# Patient Record
Sex: FEMALE | Race: WHITE
[De-identification: names, ages, dates, MRNs, and addresses within clinical notes are randomized per-mention and may not be internally consistent; named-entity substitution may affect disease eponyms.]

---

## 2018-07-07 ENCOUNTER — HOSPITAL ENCOUNTER (OUTPATIENT)
Dept: HOSPITAL 47 - FBPOP | Age: 23
Discharge: HOME | End: 2018-07-07
Attending: OBSTETRICS & GYNECOLOGY
Payer: COMMERCIAL

## 2018-07-07 VITALS
TEMPERATURE: 97.8 F | RESPIRATION RATE: 16 BRPM | HEART RATE: 105 BPM | SYSTOLIC BLOOD PRESSURE: 120 MMHG | DIASTOLIC BLOOD PRESSURE: 75 MMHG

## 2018-07-07 DIAGNOSIS — O21.2: ICD-10-CM

## 2018-07-07 DIAGNOSIS — Z3A.39: ICD-10-CM

## 2018-07-07 DIAGNOSIS — O47.1: Primary | ICD-10-CM

## 2018-07-07 PROCEDURE — 99213 OFFICE O/P EST LOW 20 MIN: CPT

## 2018-07-07 PROCEDURE — 59025 FETAL NON-STRESS TEST: CPT

## 2018-07-16 ENCOUNTER — HOSPITAL ENCOUNTER (INPATIENT)
Dept: HOSPITAL 47 - 4FBP | Age: 23
LOS: 3 days | Discharge: HOME | End: 2018-07-19
Attending: OBSTETRICS & GYNECOLOGY | Admitting: OBSTETRICS & GYNECOLOGY
Payer: COMMERCIAL

## 2018-07-16 VITALS — BODY MASS INDEX: 33.6 KG/M2

## 2018-07-16 DIAGNOSIS — Z79.899: ICD-10-CM

## 2018-07-16 DIAGNOSIS — O48.0: ICD-10-CM

## 2018-07-16 DIAGNOSIS — Z3A.40: ICD-10-CM

## 2018-07-16 LAB
BASOPHILS # BLD AUTO: 0 K/UL (ref 0–0.2)
BASOPHILS NFR BLD AUTO: 0 %
EOSINOPHIL # BLD AUTO: 0.6 K/UL (ref 0–0.7)
EOSINOPHIL NFR BLD AUTO: 4 %
ERYTHROCYTE [DISTWIDTH] IN BLOOD BY AUTOMATED COUNT: 4.08 M/UL (ref 3.8–5.4)
ERYTHROCYTE [DISTWIDTH] IN BLOOD: 13.5 % (ref 11.5–15.5)
HCT VFR BLD AUTO: 37.2 % (ref 34–46)
HGB BLD-MCNC: 12.8 GM/DL (ref 11.4–16)
LYMPHOCYTES # SPEC AUTO: 2.1 K/UL (ref 1–4.8)
LYMPHOCYTES NFR SPEC AUTO: 14 %
MCH RBC QN AUTO: 31.3 PG (ref 25–35)
MCHC RBC AUTO-ENTMCNC: 34.4 G/DL (ref 31–37)
MCV RBC AUTO: 91.2 FL (ref 80–100)
MONOCYTES # BLD AUTO: 0.6 K/UL (ref 0–1)
MONOCYTES NFR BLD AUTO: 4 %
NEUTROPHILS # BLD AUTO: 10.8 K/UL (ref 1.3–7.7)
NEUTROPHILS NFR BLD AUTO: 75 %
PLATELET # BLD AUTO: 307 K/UL (ref 150–450)
WBC # BLD AUTO: 14.4 K/UL (ref 3.8–10.6)

## 2018-07-16 PROCEDURE — 88307 TISSUE EXAM BY PATHOLOGIST: CPT

## 2018-07-16 PROCEDURE — 3E0R3BZ INTRODUCTION OF ANESTHETIC AGENT INTO SPINAL CANAL, PERCUTANEOUS APPROACH: ICD-10-PCS

## 2018-07-16 PROCEDURE — 10907ZC DRAINAGE OF AMNIOTIC FLUID, THERAPEUTIC FROM PRODUCTS OF CONCEPTION, VIA NATURAL OR ARTIFICIAL OPENING: ICD-10-PCS

## 2018-07-16 PROCEDURE — 00HU33Z INSERTION OF INFUSION DEVICE INTO SPINAL CANAL, PERCUTANEOUS APPROACH: ICD-10-PCS

## 2018-07-16 PROCEDURE — 3E033VJ INTRODUCTION OF OTHER HORMONE INTO PERIPHERAL VEIN, PERCUTANEOUS APPROACH: ICD-10-PCS

## 2018-07-16 PROCEDURE — 85025 COMPLETE CBC W/AUTO DIFF WBC: CPT

## 2018-07-16 RX ADMIN — BUTORPHANOL TARTRATE PRN MG: 1 INJECTION, SOLUTION INTRAMUSCULAR; INTRAVENOUS at 15:56

## 2018-07-16 RX ADMIN — POTASSIUM CHLORIDE SCH MLS/HR: 14.9 INJECTION, SOLUTION INTRAVENOUS at 19:06

## 2018-07-16 RX ADMIN — Medication SCH MLS/HR: at 06:48

## 2018-07-16 RX ADMIN — POTASSIUM CHLORIDE SCH MLS/HR: 14.9 INJECTION, SOLUTION INTRAVENOUS at 06:48

## 2018-07-16 RX ADMIN — POTASSIUM CHLORIDE SCH MLS/HR: 14.9 INJECTION, SOLUTION INTRAVENOUS at 15:58

## 2018-07-16 RX ADMIN — BUTORPHANOL TARTRATE PRN MG: 1 INJECTION, SOLUTION INTRAMUSCULAR; INTRAVENOUS at 17:25

## 2018-07-16 NOTE — P.HPOB
History of Present Illness


H&P Date: 18


Chief Complaint: Induction of Labor





23 year old  presents at 40 weeks 5 days for induction of labor. Her cervix 

is 1-2/70/-2. She is hilda irregularly. Fetal heart tones 135-140 with 

moderate variability and reactive.





Review of Systems


All systems: negative


Constitutional: Denies chills, Denies fever


Eyes: denies blurred vision, denies pain


Ears, nose, mouth and throat: Denies headache, Denies sore throat


Cardiovascular: Denies chest pain, Denies shortness of breath


Respiratory: Denies cough


Gastrointestinal: Denies abdominal pain, Denies diarrhea, Denies nausea, Denies 

vomiting


Genitourinary: Denies dysuria, Denies hematuria


Musculoskeletal: Denies myalgias


Integumentary: Denies pruritus, Denies rash


Neurological: Denies numbness, Denies weakness


Psychiatric: Denies anxiety, Denies depression


Endocrine: Denies fatigue, Denies weight change





Past Medical History


Past Medical History: No Reported History


Additional Past Medical History / Comment(s): Obstetric history: This is her 

first pregnancy and she has had prenatal care with me since 14 weeks.  A+, abs 

neg, Rub Imm, Hep  B neg, RPR NR.  normal 1hr and normal anatomy US. neg GBS. 

FRANCIA was 14 last week.


History of Any Multi-Drug Resistant Organisms: None Reported


Past Surgical History: No Surgical Hx Reported


Past Anesthesia/Blood Transfusion Reactions: No Reported Reaction


Past Psychological History: No Psychological Hx Reported


Smoking Status: Never smoker


Past Alcohol Use History: None Reported


Past Drug Use History: None Reported





- Past Family History


  ** Father


Family Medical History: No Reported History





Medications and Allergies


 Home Medications











 Medication  Instructions  Recorded  Confirmed  Type


 


Pnv,Calcium 72/Iron/Folic Acid 1 tab PO DAILY 18 History





[Prenatal Plus Tablet]    











 Allergies











Allergy/AdvReac Type Severity Reaction Status Date / Time


 


codeine Allergy  Itching Verified 18 06:36














Exam


Osteopathic Statement: *.  No significant issues noted on an osteopathic 

structural exam other than those noted in the History and Physical/Consult.


 Vital Signs











  Temp Pulse Resp BP


 


 18 08:21  97.3 F L  106 H  16  122/76








 Intake and Output











 07/15/18 07/16/18 07/16/18





 22:59 06:59 14:59


 


Other:   


 


  Weight  86.183 kg 86.183 kg














HEart: RRR


Lungs: CTAB


Abdomen: soft, nontender


Extremeties: neg anel's





Results


Result Diagrams: 


 18 06:40





 Abnormal Lab Results - Last 24 Hours (Table)











  18 Range/Units





  06:40 


 


WBC  14.4 H  (3.8-10.6)  k/uL


 


Neutrophils #  10.8 H  (1.3-7.7)  k/uL














Assessment and Plan


(1) Normal labor


Current Visit: Yes   Status: Acute   Code(s): O80 - ENCOUNTER FOR FULL-TERM 

UNCOMPLICATED DELIVERY; Z37.9 - OUTCOME OF DELIVERY, UNSPECIFIED   SNOMED Code(s

): 29341368


   


Plan: 





1. induction of labor with amniotomy and pitocin.


2. anticipate normal vaginal delivery

## 2018-07-17 RX ADMIN — KETOROLAC TROMETHAMINE PRN MG: 30 INJECTION, SOLUTION INTRAMUSCULAR at 12:28

## 2018-07-17 RX ADMIN — CEFAZOLIN SCH GM: 10 INJECTION, POWDER, FOR SOLUTION INTRAVENOUS at 08:26

## 2018-07-17 RX ADMIN — DOCUSATE SODIUM AND SENNOSIDES SCH EACH: 50; 8.6 TABLET ORAL at 08:29

## 2018-07-17 RX ADMIN — KETOROLAC TROMETHAMINE PRN MG: 30 INJECTION, SOLUTION INTRAMUSCULAR at 06:38

## 2018-07-17 RX ADMIN — Medication SCH: at 06:54

## 2018-07-17 RX ADMIN — POTASSIUM CHLORIDE SCH: 14.9 INJECTION, SOLUTION INTRAVENOUS at 22:29

## 2018-07-17 RX ADMIN — KETOROLAC TROMETHAMINE PRN MG: 30 INJECTION, SOLUTION INTRAMUSCULAR at 19:58

## 2018-07-17 RX ADMIN — CEFAZOLIN SCH GM: 10 INJECTION, POWDER, FOR SOLUTION INTRAVENOUS at 16:03

## 2018-07-17 RX ADMIN — POTASSIUM CHLORIDE SCH: 14.9 INJECTION, SOLUTION INTRAVENOUS at 03:52

## 2018-07-17 RX ADMIN — DOCUSATE SODIUM AND SENNOSIDES SCH EACH: 50; 8.6 TABLET ORAL at 19:58

## 2018-07-17 RX ADMIN — POTASSIUM CHLORIDE SCH: 14.9 INJECTION, SOLUTION INTRAVENOUS at 06:54

## 2018-07-17 NOTE — P.PN
Progress Note - Text


Progress Note Date: 18





23-year-old female status post  section with Duramorph spinal.  Patient 

doing well today, with no complications.  No motor deficits, no sensory deficits

, no pruritus.  Patient tolerating her diet, and ambulating well.

## 2018-07-18 LAB
BASOPHILS # BLD AUTO: 0 K/UL (ref 0–0.2)
BASOPHILS NFR BLD AUTO: 0 %
EOSINOPHIL # BLD AUTO: 0.2 K/UL (ref 0–0.7)
EOSINOPHIL NFR BLD AUTO: 1 %
ERYTHROCYTE [DISTWIDTH] IN BLOOD BY AUTOMATED COUNT: 3.29 M/UL (ref 3.8–5.4)
ERYTHROCYTE [DISTWIDTH] IN BLOOD: 13.4 % (ref 11.5–15.5)
HCT VFR BLD AUTO: 30.3 % (ref 34–46)
HGB BLD-MCNC: 10.3 GM/DL (ref 11.4–16)
LYMPHOCYTES # SPEC AUTO: 1.1 K/UL (ref 1–4.8)
LYMPHOCYTES NFR SPEC AUTO: 5 %
MCH RBC QN AUTO: 31.4 PG (ref 25–35)
MCHC RBC AUTO-ENTMCNC: 34.2 G/DL (ref 31–37)
MCV RBC AUTO: 92 FL (ref 80–100)
MONOCYTES # BLD AUTO: 0.7 K/UL (ref 0–1)
MONOCYTES NFR BLD AUTO: 3 %
NEUTROPHILS # BLD AUTO: 19.3 K/UL (ref 1.3–7.7)
NEUTROPHILS NFR BLD AUTO: 90 %
PLATELET # BLD AUTO: 255 K/UL (ref 150–450)
WBC # BLD AUTO: 21.5 K/UL (ref 3.8–10.6)

## 2018-07-18 RX ADMIN — DOCUSATE SODIUM AND SENNOSIDES SCH EACH: 50; 8.6 TABLET ORAL at 08:09

## 2018-07-18 RX ADMIN — HYDROCODONE BITARTRATE AND ACETAMINOPHEN PRN EACH: 7.5; 325 TABLET ORAL at 19:26

## 2018-07-18 RX ADMIN — CEFAZOLIN SODIUM SCH MLS/HR: 1 INJECTION, SOLUTION INTRAVENOUS at 10:02

## 2018-07-18 RX ADMIN — HYDROCODONE BITARTRATE AND ACETAMINOPHEN PRN EACH: 7.5; 325 TABLET ORAL at 11:31

## 2018-07-18 RX ADMIN — IBUPROFEN PRN MG: 600 TABLET ORAL at 08:09

## 2018-07-18 RX ADMIN — IBUPROFEN PRN MG: 600 TABLET ORAL at 16:24

## 2018-07-18 RX ADMIN — DIMETHICONE PRN MG: 80 TABLET, CHEWABLE ORAL at 22:57

## 2018-07-18 RX ADMIN — CEFAZOLIN SODIUM SCH MLS/HR: 1 INJECTION, SOLUTION INTRAVENOUS at 18:03

## 2018-07-18 RX ADMIN — DOCUSATE SODIUM AND SENNOSIDES SCH: 50; 8.6 TABLET ORAL at 20:13

## 2018-07-18 RX ADMIN — DIMETHICONE PRN MG: 80 TABLET, CHEWABLE ORAL at 11:31

## 2018-07-18 RX ADMIN — IBUPROFEN PRN MG: 600 TABLET ORAL at 22:57

## 2018-07-18 NOTE — P.PNOBGPC
Subjective





- Subjective


Principal diagnosis: Status post primary low transverse .  POD #1


Interval history: 





Patient seen and examined.  She did have a fever overnight and antibiotics were 

continued and I will continue them today.  Her pain is controlled with Norco 

and Motrin.  She is tolerating clears.  I will advance her to regular diet 

today.  Positive flatus.  She is ambulating and voiding without difficulty.  

CBC was just drawn and I will check those results.


Patient reports: Reports appetite normal, Reports voiding normally, Reports 

pain well controlled, Reports ambulating normally


: doing well





Objective





- Vital Signs


Latest vital signs: 


 Vital Signs











  Temp Pulse Resp BP Pulse Ox


 


 18 20:00  100.5 F H  128 H  16  110/56  97


 


 18 17:00      100


 


 18 16:00  98.5 F  106 H  16  110/67 


 


 18 12:00  98.0 F  112 H  18  107/68 


 


 18 09:52    18  


 


 18 09:00      97








 Intake and Output











 18





 22:59 06:59 14:59


 


Output Total 300  


 


Balance -300  


 


Output:   


 


  Urine 300  


 


Other:   


 


  # Voids 1  














- Exam


Lungs: bilateral: normal


Chest: Normal S1, Normal S2


Extremities: Present: normal


Abdomen: Present: normal appearance, soft.  Absent: distention, tenderness


Incision: Present: normal, dry, intact


Uterus: Present: normal, firm





Assessment and Plan


(1) Normal labor


Current Visit: Yes   Status: Resolved   Code(s): O80 - ENCOUNTER FOR FULL-TERM 

UNCOMPLICATED DELIVERY; Z37.9 - OUTCOME OF DELIVERY, UNSPECIFIED   SNOMED Code(s

): 06430526


   





(2) Status post primary low transverse  section


Current Visit: Yes   Status: Acute   Code(s): Z98.891 - HISTORY OF UTERINE SCAR 

FROM PREVIOUS SURGERY   SNOMED Code(s): 739525899


   





(3) Postoperative fever


Current Visit: Yes   Status: Acute   Code(s): R50.82 - POSTPROCEDURAL FEVER   

SNOMED Code(s): 829449668


   


Plan: 





1.  Increase ambulation


2.  Encourage incentive spirometry


3. kefzol 1 g IV every 6 hours


4.  Check CBC


5.  By mouth pain meds

## 2018-07-18 NOTE — P.OP
Date of Procedure: 18


Preoperative Diagnosis: 


1.  at 40 weeks 5 days


2. arrest of dilation





Postoperative Diagnosis: 


same


Procedure(s) Performed: 


primary low transverse 


Anesthesia: epidural


Surgeon: Melissa Luz


Assistant #1: Carrie Mathews


Estimated Blood Loss (ml): 600


IV fluids (ml): 1,000


Urine output (ml): 100


Pathology: other (placenta)


Condition: stable


Disposition: floor


Indications for Procedure: 


Pt presented at 40 weeks 5 days for induction of labor. HEr cervix was 1-2/70/-

2 and she was not hilda. Fetal heart tones were 135-140 with moderate 

variability and reactive.  Anatomy was performed at 7:15 AM and Pitocin was 

started.  Patient progressed slowly throughout the day and did receive some 

Stadol for pain medication and then an epidural.  She had adequate contractions 

and did make it to 4 cm but did not past this despite adequate contractions for 

several hours.  Informed consent was obtained and  section was called.


Operative Findings: 


viable male, apgars pending, weight 9# 7 ounces


Description of Procedure: 


Patient was taken to the operating room where epidural anesthesia was found be 

adequate.  She was prepped and draped in normal sterile fashion in dorsal 

supine position with a leftward tilt.  Pfannenstiel skin incision was made the 

scalpel and carried through to the underlying layer of fascia with the scalpel.

  Fascia was incised in midline and carried bilaterally with the Dickerson scissors.

  The superior aspect of the fascial incision was grasped with Daniel clamps 

elevated and the underlying rectus muscles dissected off with the Dickerson's.  

Attention was then turned to inferior aspect of same incision which in a 

similar fashion was grasped tented up and the underlying rectus muscles 

dissected off with the Dickerson's.  The rectus muscles were  the midline 

and the peritoneum was identified tented up and entered sharply with the 

scalpel.  The incision was extended superiorly and inferiorly with good 

visualization of the bladder.  The bladder blade was inserted and the 

vesicouterine peritoneum was incised the Metzenbaums then carried bilaterally 

and bladder flap created digitally.  A low transverse incision was then made on 

the uterus with the scalpel.  This was carried bilaterally and digital manner.  

Infant's head delivered atraumatically, nose and mouth bulb suctioned, cord 

clamped and cut, infant handed off to waiting nurses.  Apgars pending, weight 9 

lbs. 7 oz.  Placenta delivered manually, intact with three-vessel cord.  The 

uterus is exteriorized and cleared of all clots and debris.  The uterine 

incision was closed with 0 Vicryl in a running locked fashion.  Second layer of 

the same sutures used in imbricating fashion to obtain excellent hemostasis.  

Bladder flap was then reapproximated using 2-0 Vicryl in a running fashion.  

Both ovaries and tubes appeared normal.  The uterus was placed back into the 

abdomen.  The peritoneum was reapproximated using 2-0 Vicryl in a running 

fashion.  The muscles were reapproximated using 2-0 Vicryl in interrupted 

fashion.  The fascia was reapproximated using 0 Vicryl in a running fashion.  

The subcutaneous tissues closed with 3-0 Vicryl running fashion.  The skin was 

closed staples.  Patient tolerated the procedure well, sponge and instrument 

counts were correct times 2 and she was taken to the recovery room in stable 

condition.

## 2018-07-19 VITALS — DIASTOLIC BLOOD PRESSURE: 72 MMHG | HEART RATE: 107 BPM | SYSTOLIC BLOOD PRESSURE: 106 MMHG | TEMPERATURE: 97.8 F

## 2018-07-19 VITALS — RESPIRATION RATE: 18 BRPM

## 2018-07-19 LAB
BASOPHILS # BLD AUTO: 0 K/UL (ref 0–0.2)
BASOPHILS NFR BLD AUTO: 0 %
EOSINOPHIL # BLD AUTO: 0.6 K/UL (ref 0–0.7)
EOSINOPHIL NFR BLD AUTO: 5 %
ERYTHROCYTE [DISTWIDTH] IN BLOOD BY AUTOMATED COUNT: 3.53 M/UL (ref 3.8–5.4)
ERYTHROCYTE [DISTWIDTH] IN BLOOD: 13.4 % (ref 11.5–15.5)
HCT VFR BLD AUTO: 32.7 % (ref 34–46)
HGB BLD-MCNC: 10.9 GM/DL (ref 11.4–16)
LYMPHOCYTES # SPEC AUTO: 1.4 K/UL (ref 1–4.8)
LYMPHOCYTES NFR SPEC AUTO: 11 %
MCH RBC QN AUTO: 30.8 PG (ref 25–35)
MCHC RBC AUTO-ENTMCNC: 33.2 G/DL (ref 31–37)
MCV RBC AUTO: 92.7 FL (ref 80–100)
MONOCYTES # BLD AUTO: 0.5 K/UL (ref 0–1)
MONOCYTES NFR BLD AUTO: 4 %
NEUTROPHILS # BLD AUTO: 9.6 K/UL (ref 1.3–7.7)
NEUTROPHILS NFR BLD AUTO: 78 %
PLATELET # BLD AUTO: 309 K/UL (ref 150–450)
WBC # BLD AUTO: 12.3 K/UL (ref 3.8–10.6)

## 2018-07-19 RX ADMIN — IBUPROFEN PRN MG: 600 TABLET ORAL at 08:09

## 2018-07-19 RX ADMIN — CEFAZOLIN SODIUM SCH MLS/HR: 1 INJECTION, SOLUTION INTRAVENOUS at 17:25

## 2018-07-19 RX ADMIN — HYDROCODONE BITARTRATE AND ACETAMINOPHEN PRN EACH: 7.5; 325 TABLET ORAL at 02:44

## 2018-07-19 RX ADMIN — CEFAZOLIN SODIUM SCH MLS/HR: 1 INJECTION, SOLUTION INTRAVENOUS at 01:56

## 2018-07-19 RX ADMIN — CEFAZOLIN SODIUM SCH MLS/HR: 1 INJECTION, SOLUTION INTRAVENOUS at 09:24

## 2018-07-19 RX ADMIN — DOCUSATE SODIUM AND SENNOSIDES SCH: 50; 8.6 TABLET ORAL at 08:10

## 2018-07-24 NOTE — P.MSEPDOC
Presenting Problems





- Arrival Data


Date of Arrival on Unit: 18


Time of Arrival on Unit: 12:50


Mode of Transport: Wheelchair





- Complaint


OB-Reason for Admission/Chief Complaint: Possible Onset of Labor, Other


Comment: nausea and vomiting last night. abd hard 2am to 5 am . then soft again





Prenatal Medical History





- Pregnancy Information


: 1


Para: 0


Term: 0


: 0


Abortions: Spontaneous or Elective: 0


Number of Living Children: 0





- Gestational Age


Gestational Age by JOE (wks/days): 39 Weeks and 3 Days





Review of Systems





- Review of Systems


Constitutional: No problems


Breast: No problems


ENT: No problems


Cardiovascular: No problems


Respiratory: No problems


Gastrointestinal: No problems


Genitourinary: No problems


Musculoskeletal: No problems


Neurological: No problems


Skin: No problems





Vital Signs





- Temperature


Temperature: 97.8 F


Temperature Source: Tympanic





- Pulse


  ** Right Apical


Pulse Rate: 105


Pulse Assessment Method: Automatic Cuff





- Respirations


Respiratory Rate: 16


Oxygen Delivery Method: Room Air





- Blood Pressure


  ** Right Arm


Blood Pressure: 120/75


Blood Pressure Mean: 90


Blood Pressure Source: Automatic Cuff





Medical Screen Scoring (Pre)





- Cervical Exam


Dilation: 1-3 cm = 1





- Uterine Contractions


Frequency: N/A


Duration: > 40 seconds = 2


Intensity: N/A





- Maternal Vital Signs


Maternal Temperature: N/A


Maternal Blood Pressure: N/A


Signs of Preeclampsia: N/A


Maternal Respirations: N/A





- Pain Assessment


Pain Location and Character: Generalized, Abdomen


Pain Scale Used: Numeric (1 - 10)


Pain Intensity: 0


Pain Description: *Acute, Pressure


Pain Frequency: 3 hours constant


Pain Duration Units: Hours


Pain Behavior: None Exhibited





- Maternal Trauma


Maternal Trauma: N/A





- Fetal Assessment


Fetal Heart Rate - NICHD Category: Category I (Normal) = 0


NST: Reactive


Fetal Position: N/A


Fetal Station: N/A





- Total Score


Total Score (Pre): 3





- Level of Risk


Level of Risk: Low (0-5)





Physician Notification (Pre)





- Physician Notified


Physician Notified Date: 18


Physician Notified Time: 13:50


Physician/Practitioner Notifed:: luanne


Spoke With: luanne


New Order Received: Yes (discharge home)





Disposition





- Disposition


OB Disposition: Discharge to home


Discharge Date: 18


Discharge Time: 14:10


I agree with the RN Medical Screening Exam: Yes


Risk & Benefit of care provided described in d/c instruction: Yes


Diagnosis: FALSE LABOR AT OR AFTER 37 COMPLETED WEEKS OF GESTATION

## 2021-06-22 ENCOUNTER — HOSPITAL ENCOUNTER (OUTPATIENT)
Dept: HOSPITAL 47 - RADUSWWP | Age: 26
Discharge: HOME | End: 2021-06-22
Attending: OBSTETRICS & GYNECOLOGY
Payer: COMMERCIAL

## 2021-06-22 DIAGNOSIS — Z36.89: Primary | ICD-10-CM

## 2021-06-22 DIAGNOSIS — Z3A.12: ICD-10-CM

## 2021-06-22 PROCEDURE — 76801 OB US < 14 WKS SINGLE FETUS: CPT

## 2021-06-22 NOTE — US
EXAMINATION TYPE: Transabdominal

 

DATE OF EXAM: 6/22/2021 2:45 PM

 

COMPARISON: NONE

 

CLINICAL HISTORY: Z36 CONFIRM DATES.

 

EXAM PERFORMED:  Transabdominal (TA)

 

EXAM MEASUREMENTS:

 

GESTATIONAL AGE / DATING

 

Physician Established: Not yet established 

Dates by LMP:  LMP unknown 

Dates by First Scan:  No previous this is first scan 

Dates by Current Scan for: (12 weeks/1 days)  

** EDC: 01/03/2022

 

MATERNAL ANATOMY 

 

Uterus: 16.2 x 6.7 x 8.1cm

Right Ovary: 3.7 x 2.2 x 2.6cm

Left Ovary: 2.6 x 1.3 x 2.8cm

Post CDS / Adnexa: wnl

Presence of free fluid: no

Presence of corpus luteal cyst: not seen

Presence of subchorionic bleed: no

 

GESTATION / FETAL SURVEY

 

CRL: 5.4cm (12 weeks/1 days)

Yolk Sac (normal less than 6mm): not seen

Heart Rate: 161 bpm

Rhythm:  Normal

IUP:  Viable IUP

 

Date of LMP: Unknown

Beta HcG (if available):  Not available at time of exam

 

 

 

 

 

IMPRESSION:

Single live intrauterine pregnancy measuring approximately 12 weeks and 1 day by sonographic criteria
.

## 2021-12-28 ENCOUNTER — HOSPITAL ENCOUNTER (INPATIENT)
Dept: HOSPITAL 47 - 4FBP | Age: 26
LOS: 2 days | Discharge: HOME | End: 2021-12-30
Attending: OBSTETRICS & GYNECOLOGY | Admitting: OBSTETRICS & GYNECOLOGY
Payer: COMMERCIAL

## 2021-12-28 VITALS — BODY MASS INDEX: 41.6 KG/M2

## 2021-12-28 DIAGNOSIS — O34.211: Primary | ICD-10-CM

## 2021-12-28 DIAGNOSIS — Z3A.39: ICD-10-CM

## 2021-12-28 LAB
BASOPHILS # BLD AUTO: 0 K/UL (ref 0–0.2)
BASOPHILS NFR BLD AUTO: 0 %
EOSINOPHIL # BLD AUTO: 0.3 K/UL (ref 0–0.7)
EOSINOPHIL NFR BLD AUTO: 3 %
ERYTHROCYTE [DISTWIDTH] IN BLOOD BY AUTOMATED COUNT: 3.91 M/UL (ref 3.8–5.4)
ERYTHROCYTE [DISTWIDTH] IN BLOOD: 15 % (ref 11.5–15.5)
HCT VFR BLD AUTO: 37.3 % (ref 34–46)
HGB BLD-MCNC: 12.2 GM/DL (ref 11.4–16)
LYMPHOCYTES # SPEC AUTO: 1.6 K/UL (ref 1–4.8)
LYMPHOCYTES NFR SPEC AUTO: 15 %
MCH RBC QN AUTO: 31.2 PG (ref 25–35)
MCHC RBC AUTO-ENTMCNC: 32.6 G/DL (ref 31–37)
MCV RBC AUTO: 95.6 FL (ref 80–100)
MONOCYTES # BLD AUTO: 0.5 K/UL (ref 0–1)
MONOCYTES NFR BLD AUTO: 5 %
NEUTROPHILS # BLD AUTO: 7.8 K/UL (ref 1.3–7.7)
NEUTROPHILS NFR BLD AUTO: 75 %
PLATELET # BLD AUTO: 220 K/UL (ref 150–450)
WBC # BLD AUTO: 10.4 K/UL (ref 3.8–10.6)

## 2021-12-28 PROCEDURE — 86850 RBC ANTIBODY SCREEN: CPT

## 2021-12-28 PROCEDURE — 86900 BLOOD TYPING SEROLOGIC ABO: CPT

## 2021-12-28 PROCEDURE — 85025 COMPLETE CBC W/AUTO DIFF WBC: CPT

## 2021-12-28 PROCEDURE — 86901 BLOOD TYPING SEROLOGIC RH(D): CPT

## 2021-12-28 RX ADMIN — POTASSIUM CHLORIDE SCH MLS/HR: 14.9 INJECTION, SOLUTION INTRAVENOUS at 13:38

## 2021-12-28 RX ADMIN — KETOROLAC TROMETHAMINE SCH MG: 30 INJECTION, SOLUTION INTRAMUSCULAR at 15:27

## 2021-12-28 RX ADMIN — IBUPROFEN SCH: 600 TABLET ORAL at 15:28

## 2021-12-28 RX ADMIN — POTASSIUM CHLORIDE SCH: 14.9 INJECTION, SOLUTION INTRAVENOUS at 09:33

## 2021-12-28 RX ADMIN — ACETAMINOPHEN SCH: 500 TABLET ORAL at 15:28

## 2021-12-28 RX ADMIN — KETOROLAC TROMETHAMINE SCH MG: 30 INJECTION, SOLUTION INTRAMUSCULAR at 21:44

## 2021-12-28 RX ADMIN — ACETAMINOPHEN SCH: 500 TABLET ORAL at 18:26

## 2021-12-28 NOTE — P.HPOB
History of Present Illness


H&P Date: 21


Chief Complaint: repeat low transverse 





26 year old  presents at 39 weeks for repeat low transverse .





Review of Systems


All systems: negative


Constitutional: Denies chills, Denies fever


Eyes: denies blurred vision, denies pain


Ears, nose, mouth and throat: Denies headache, Denies sore throat


Cardiovascular: Denies chest pain, Denies shortness of breath


Respiratory: Denies cough


Gastrointestinal: Denies abdominal pain, Denies diarrhea, Denies nausea, Denies 

vomiting


Genitourinary: Denies dysuria, Denies hematuria


Musculoskeletal: Denies myalgias


Integumentary: Denies pruritus, Denies rash


Neurological: Denies numbness, Denies weakness


Psychiatric: Denies anxiety, Denies depression


Endocrine: Denies fatigue, Denies weight change





Past Medical History


Past Medical History: No Reported History


Additional Past Medical History / Comment(s): Obstetric history: First pregnancy

was delivered by .  This is her second pregnancy and she has had 

prenatal care with me since first trimester.  A+, abs neg, Rub Imm, Hep  B neg, 

RPR NR.  normal 1hr and normal anatomy US. neg GBS. She did have covid-19 at 35 

weeks.


History of Any Multi-Drug Resistant Organisms: None Reported


Past Surgical History: Appendectomy,  Section, Tonsillectomy


Additional Past Surgical History / Comment(s): [place] teeth


Past Anesthesia/Blood Transfusion Reactions: No Reported Reaction


Past Psychological History: No Psychological Hx Reported


Smoking Status: Never smoker


Past Alcohol Use History: None Reported


Past Drug Use History: None Reported





- Past Family History


  ** Father


Family Medical History: No Reported History





Medications and Allergies


                                Home Medications











 Medication  Instructions  Recorded  Confirmed  Type


 


Pnv,Calcium 72/Iron/Folic Acid 1 tab PO DAILY 18 History





[Prenatal Plus Tablet]    








                                    Allergies











Allergy/AdvReac Type Severity Reaction Status Date / Time


 


codeine Allergy  Dyspnea, Verified 21 06:25





   RASH  














Exam


Osteopathic Statement: *.  No significant issues noted on an osteopathic 

structural exam other than those noted in the History and Physical/Consult.


                                   Vital Signs











  Temp Pulse Resp BP Pulse Ox


 


 21 06:28  97.3 F L  117 H  18  127/78  98








                                Intake and Output











 21





 22:59 06:59 14:59


 


Other:   


 


  Weight  104.326 kg 














Heart: Regular rate and rhythm


Lungs: Clear to auscultation bilaterally


Abdomen: Soft, nontender


Extremities: Negative Homans sign





Results


Result Diagrams: 


                                 21 06:40





                  Abnormal Lab Results - Last 24 Hours (Table)











  21 Range/Units





  06:40 


 


Neutrophils #  7.8 H  (1.3-7.7)  k/uL














Assessment and Plan


(1) Previous  section


Current Visit: Yes   Status: Acute   Code(s): Z98.891 - HISTORY OF UTERINE SCAR 

FROM PREVIOUS SURGERY   SNOMED Code(s): 796278382


   





(2) 39 weeks gestation of pregnancy


Current Visit: Yes   Status: Acute   Code(s): Z3A.39 - 39 WEEKS GESTATION OF 

PREGNANCY   SNOMED Code(s): 69939950


   


Plan: 





1. repeat low transverse

## 2021-12-28 NOTE — P.OP
Date of Procedure: 21


Preoperative Diagnosis: 


1. Previous 


2. 39 weeks


Postoperative Diagnosis: 


1. Previous 


2. 39 weeks


Procedure(s) Performed: 


Repeat low transverse 


Anesthesia: spinal


Surgeon: Melissa Luz


Assistant #1: Kendra Montgomery


Estimated Blood Loss (ml): 290


IV fluids (ml): 1,200


Urine output (ml): 200


Pathology: none sent


Condition: stable


Disposition: floor


Operative Findings: 


Viable male, Apgars 8, 8, weight 8 lbs. 7 oz.


Description of Procedure: 


Patient was taken to the operating room where spinal anesthesia was found be 

adequate.  She was prepped and draped in normal sterile fashion in dorsal supine

position with a leftward tilt.  Pfannenstiel skin incision was made the scalpel 

and carried through to the underlying layer of fascia with the scalpel.  Fascia 

was incised in midline and carried bilaterally with the Dickerson scissors.  The 

superior aspect of the fascial incision was grasped with Daniel clamps elevated 

and the underlying rectus muscles dissected off with the Dickerson's.  Attention was 

then turned to inferior aspect of same incision which in a similar fashion was 

grasped tented up and the underlying rectus muscles dissected off with the Ma

lam's.  The rectus muscles were  the midline and the peritoneum was 

identified tented up and entered sharply with the scalpel.  The incision was 

extended superiorly and inferiorly with good visualization of the bladder.  The 

bladder blade was inserted and the vesicouterine peritoneum was incised the 

Metzenbaums then carried bilaterally and bladder flap created digitally.  A low 

transverse incision was then made on the uterus with the scalpel.  This was 

carried bilaterally and digital manner.  Infant's head delivered atraumatically,

nose and mouth bulb suctioned, cord clamped and cut, infant handed off to 

waiting nurses.  Apgars 8,8, weight 8 lbs. 7 oz.  Placenta delivered manually, 

intact with three-vessel cord.  The uterus is exteriorized and cleared of all 

clots and debris.  The uterine incision was closed with 0 Vicryl in a running 

locked fashion.  Second layer of the same sutures used in imbricating fashion to

obtain excellent hemostasis.  Bladder flap was then reapproximated using 2-0 

Vicryl in a running fashion.  Both ovaries and tubes appeared normal.  The 

uterus was placed back into the abdomen.  The peritoneum was reapproximated 

using 2-0 Vicryl in a running fashion.  The muscles were reapproximated using 2-

0 Vicryl in interrupted fashion.  The fascia was reapproximated using 0 Vicryl 

in a running fashion.  The subcutaneous tissues closed with 3-0 Vicryl running 

fashion.  The skin was closed staples.  Patient tolerated the procedure well, 

sponge and instrument counts were correct times 2 and she was taken to the 

recovery room in stable condition.

## 2021-12-29 VITALS — RESPIRATION RATE: 16 BRPM

## 2021-12-29 LAB
BASOPHILS # BLD AUTO: 0 K/UL (ref 0–0.2)
BASOPHILS NFR BLD AUTO: 0 %
EOSINOPHIL # BLD AUTO: 0.4 K/UL (ref 0–0.7)
EOSINOPHIL NFR BLD AUTO: 3 %
ERYTHROCYTE [DISTWIDTH] IN BLOOD BY AUTOMATED COUNT: 3.67 M/UL (ref 3.8–5.4)
ERYTHROCYTE [DISTWIDTH] IN BLOOD: 14.3 % (ref 11.5–15.5)
HCT VFR BLD AUTO: 35 % (ref 34–46)
HGB BLD-MCNC: 11.7 GM/DL (ref 11.4–16)
LYMPHOCYTES # SPEC AUTO: 1.5 K/UL (ref 1–4.8)
LYMPHOCYTES NFR SPEC AUTO: 12 %
MCH RBC QN AUTO: 31.8 PG (ref 25–35)
MCHC RBC AUTO-ENTMCNC: 33.3 G/DL (ref 31–37)
MCV RBC AUTO: 95.4 FL (ref 80–100)
MONOCYTES # BLD AUTO: 0.6 K/UL (ref 0–1)
MONOCYTES NFR BLD AUTO: 5 %
NEUTROPHILS # BLD AUTO: 9.3 K/UL (ref 1.3–7.7)
NEUTROPHILS NFR BLD AUTO: 78 %
PLATELET # BLD AUTO: 239 K/UL (ref 150–450)
WBC # BLD AUTO: 11.9 K/UL (ref 3.8–10.6)

## 2021-12-29 RX ADMIN — IBUPROFEN SCH: 600 TABLET ORAL at 09:19

## 2021-12-29 RX ADMIN — ACETAMINOPHEN SCH: 500 TABLET ORAL at 09:20

## 2021-12-29 RX ADMIN — IBUPROFEN SCH MG: 600 TABLET ORAL at 16:59

## 2021-12-29 RX ADMIN — DOCUSATE SODIUM AND SENNOSIDES SCH EACH: 50; 8.6 TABLET ORAL at 08:11

## 2021-12-29 RX ADMIN — POTASSIUM CHLORIDE SCH: 14.9 INJECTION, SOLUTION INTRAVENOUS at 16:59

## 2021-12-29 RX ADMIN — DOCUSATE SODIUM AND SENNOSIDES SCH EACH: 50; 8.6 TABLET ORAL at 22:22

## 2021-12-29 RX ADMIN — ACETAMINOPHEN SCH MG: 500 TABLET ORAL at 14:11

## 2021-12-29 RX ADMIN — DOCUSATE SODIUM AND SENNOSIDES SCH: 50; 8.6 TABLET ORAL at 04:09

## 2021-12-29 RX ADMIN — KETOROLAC TROMETHAMINE SCH: 30 INJECTION, SOLUTION INTRAMUSCULAR at 19:31

## 2021-12-29 RX ADMIN — ACETAMINOPHEN SCH: 500 TABLET ORAL at 09:19

## 2021-12-29 RX ADMIN — KETOROLAC TROMETHAMINE SCH MG: 30 INJECTION, SOLUTION INTRAMUSCULAR at 04:06

## 2021-12-29 RX ADMIN — DIMETHICONE PRN MG: 80 TABLET, CHEWABLE ORAL at 22:22

## 2021-12-29 RX ADMIN — KETOROLAC TROMETHAMINE SCH: 30 INJECTION, SOLUTION INTRAMUSCULAR at 14:03

## 2021-12-29 RX ADMIN — ACETAMINOPHEN SCH MG: 500 TABLET ORAL at 22:22

## 2021-12-29 RX ADMIN — POTASSIUM CHLORIDE SCH: 14.9 INJECTION, SOLUTION INTRAVENOUS at 09:20

## 2021-12-29 NOTE — P.PNOBGPC
Subjective





- Subjective


Principal diagnosis: S/P RLTCS POD #1


Interval history: 





Patient seen and examined.  Denies nausea, vomiting, chest pain, shortness of 

breath or any calf pain.


Patient reports: Reports appetite normal, Reports voiding normally, Reports pain

well controlled, Reports ambulating normally


[place]: doing well





Objective





- Vital Signs


Latest vital signs: 


                                   Vital Signs











  Temp Pulse Resp BP Pulse Ox


 


 21 05:00    16   96


 


 21 04:00  98.2 F  80  16  112/70 


 


 21 03:00    16  


 


 21 01:00    16   98


 


 21 00:00  98.0 F  88  16  110/64 


 


 21 23:00    16  


 


 21 21:00    16   100


 


 21 20:00  98.1 F  94  16  113/72 


 


 21 19:00    16  


 


 21 17:00    16   96


 


 21 16:23  98.3 F  93  16  102/60  96


 


 21 15:31    16  


 


 21 13:26      97


 


 21 13:00  99.2 F  115 H  16  121/82  97


 


 21 11:26    16  


 


 21 10:40  98.0 F  99  16  107/57  96


 


 21 10:10   85   114/55 


 


 21 09:40   86   94/51 


 


 21 09:26    18   98


 


 21 09:25   90  16  103/55 


 


 21 09:10   92  18  99/55  98


 


 21 08:55   88  18  101/52  98


 


 21 08:40  96.8 F L  98  18  100/58  99








                                Intake and Output











 21





 22:59 06:59 14:59


 


Intake Total 960  


 


Output Total 350  


 


Balance 610  


 


Intake:   


 


  Oral 960  


 


Output:   


 


  Urine 350  


 


Other:   


 


  # Voids  0 














- Exam


Lungs: bilateral: normal


Chest: Normal S1, Normal S2


Extremities: Present: normal


Abdomen: Present: normal appearance, soft.  Absent: distention, tenderness


Incision: Present: normal, dry, intact


Uterus: Present: normal, firm





Assessment and Plan


(1) Previous  section


Current Visit: Yes   Status: Resolved   Code(s): Z98.891 - HISTORY OF UTERINE 

SCAR FROM PREVIOUS SURGERY   SNOMED Code(s): 802231164


   





(2) 39 weeks gestation of pregnancy


Current Visit: Yes   Status: Resolved   Code(s): Z3A.39 - 39 WEEKS GESTATION OF 

PREGNANCY   SNOMED Code(s): 93455878


   





(3) Status post repeat low transverse  section


Current Visit: Yes   Status: Acute   Code(s): Z98.891 - HISTORY OF UTERINE SCAR 

FROM PREVIOUS SURGERY   SNOMED Code(s): 327106305


   


Plan: 





1.  Increase ambulation


2.  Continue pain control


3.  Regular diet

## 2021-12-30 VITALS — HEART RATE: 63 BPM | SYSTOLIC BLOOD PRESSURE: 119 MMHG | TEMPERATURE: 98 F | DIASTOLIC BLOOD PRESSURE: 75 MMHG

## 2021-12-30 RX ADMIN — IBUPROFEN SCH MG: 600 TABLET ORAL at 02:10

## 2021-12-30 RX ADMIN — IBUPROFEN SCH MG: 600 TABLET ORAL at 11:44

## 2021-12-30 RX ADMIN — ACETAMINOPHEN SCH MG: 500 TABLET ORAL at 15:08

## 2021-12-30 RX ADMIN — DIMETHICONE PRN MG: 80 TABLET, CHEWABLE ORAL at 11:43

## 2021-12-30 RX ADMIN — ACETAMINOPHEN SCH MG: 500 TABLET ORAL at 04:40

## 2021-12-30 NOTE — P.DS
Providers


Date of admission: 


21 06:06





Attending physician: 


Melissa Luz





Primary care physician: 


Stated None








- Discharge Diagnosis(es)


(1) Previous  section


Current Visit: Yes   Status: Resolved   





(2) 39 weeks gestation of pregnancy


Current Visit: Yes   Status: Resolved   





(3) Status post repeat low transverse  section


Current Visit: Yes   Status: Acute   





Plan - Discharge Summary


Discharge Rx Participant: No


New Discharge Prescriptions: 


New


   Ibuprofen [Motrin] 600 mg PO Q6H #30 tab


   oxyCODONE HCL [OxyIR] 5 mg PO Q4HR PRN #20 tab


     PRN Reason: Pain Scale 4 - 6





No Action


   Pnv,Calcium 72/Iron/Folic Acid [Prenatal Plus Tablet] 1 tab PO DAILY


Discharge Medication List





Pnv,Calcium 72/Iron/Folic Acid [Prenatal Plus Tablet] 1 tab PO DAILY 18 

[History]


Ibuprofen [Motrin] 600 mg PO Q6H #30 tab 21 [Rx]


oxyCODONE HCL [OxyIR] 5 mg PO Q4HR PRN #20 tab 21 [Rx]








Follow up Appointment(s)/Referral(s): 


Melissa Luz DO [Doctor of Osteopathic Medicine] - 22 10:45 am (c/s 

post op appt-2022@13:30)


Discharge Disposition: HOME SELF-CARE